# Patient Record
Sex: MALE | Race: WHITE
[De-identification: names, ages, dates, MRNs, and addresses within clinical notes are randomized per-mention and may not be internally consistent; named-entity substitution may affect disease eponyms.]

---

## 2019-03-29 ENCOUNTER — HOSPITAL ENCOUNTER (EMERGENCY)
Dept: HOSPITAL 92 - SCSER | Age: 34
Discharge: HOME | End: 2019-03-29
Payer: SELF-PAY

## 2019-03-29 DIAGNOSIS — N13.2: Primary | ICD-10-CM

## 2019-03-29 DIAGNOSIS — F17.210: ICD-10-CM

## 2019-03-29 LAB
ALBUMIN SERPL BCG-MCNC: 4.5 G/DL (ref 3.5–5)
ALP SERPL-CCNC: 77 U/L (ref 40–150)
ALT SERPL W P-5'-P-CCNC: 17 U/L (ref 8–55)
ANION GAP SERPL CALC-SCNC: 15 MMOL/L (ref 10–20)
AST SERPL-CCNC: 15 U/L (ref 5–34)
BACTERIA UR QL AUTO: (no result) HPF
BILIRUB SERPL-MCNC: 0.3 MG/DL (ref 0.2–1.2)
BUN SERPL-MCNC: 20 MG/DL (ref 8.9–20.6)
CALCIUM SERPL-MCNC: 9.9 MG/DL (ref 7.8–10.44)
CHLORIDE SERPL-SCNC: 104 MMOL/L (ref 98–107)
CO2 SERPL-SCNC: 24 MMOL/L (ref 22–29)
CREAT CL PREDICTED SERPL C-G-VRATE: 0 ML/MIN (ref 70–130)
GLOBULIN SER CALC-MCNC: 3.1 G/DL (ref 2.4–3.5)
GLUCOSE SERPL-MCNC: 91 MG/DL (ref 70–105)
HGB BLD-MCNC: 16.4 G/DL (ref 14–18)
HYALINE CASTS #/AREA URNS LPF: (no result) LPF
MCH RBC QN AUTO: 28.7 PG (ref 27–31)
MCV RBC AUTO: 89.7 FL (ref 78–98)
MDIFF COMPLETE?: YES
PLATELET # BLD AUTO: 329 THOU/UL (ref 130–400)
POTASSIUM SERPL-SCNC: 4.3 MMOL/L (ref 3.5–5.1)
PROT UR STRIP.AUTO-MCNC: 30 MG/DL
RBC # BLD AUTO: 5.71 MILL/UL (ref 4.7–6.1)
RBC UR QL AUTO: (no result) HPF (ref 0–3)
SODIUM SERPL-SCNC: 139 MMOL/L (ref 136–145)
SP GR UR STRIP: 1.01 (ref 1–1.03)
WBC # BLD AUTO: 7.7 THOU/UL (ref 4.8–10.8)
WBC UR QL AUTO: (no result) HPF (ref 0–3)

## 2019-03-29 PROCEDURE — 85025 COMPLETE CBC W/AUTO DIFF WBC: CPT

## 2019-03-29 PROCEDURE — 96375 TX/PRO/DX INJ NEW DRUG ADDON: CPT

## 2019-03-29 PROCEDURE — 81003 URINALYSIS AUTO W/O SCOPE: CPT

## 2019-03-29 PROCEDURE — 96374 THER/PROPH/DIAG INJ IV PUSH: CPT

## 2019-03-29 PROCEDURE — 80053 COMPREHEN METABOLIC PANEL: CPT

## 2019-03-29 PROCEDURE — 74176 CT ABD & PELVIS W/O CONTRAST: CPT

## 2019-03-29 PROCEDURE — 81015 MICROSCOPIC EXAM OF URINE: CPT

## 2019-03-29 PROCEDURE — 96361 HYDRATE IV INFUSION ADD-ON: CPT

## 2019-03-29 NOTE — CT
FCT of abdomen and pelvis 3/29/2019



COMPARISON: 3/14/2013



HISTORY: Left-sided flank pain



TECHNIQUE: Axial CT imaging at 5 mm intervals from lung bases through pubic symphysis without contras
t. Coronal reformatted imaging obtained.



FINDINGS: There are 3 subcentimeter nodules identified within the imaged right lung base, measuring u
p to 4 mm within the right lower lobe, unchanged when compared to the 2013 examination. No free intra
peritoneal air or fluid is noted.



The lack of contrast media limits assessment of the viscera, bowel, vascular structures, and for lymp
hadenopathy.



The liver, gallbladder, and spleen demonstrate a normal noncontrast enhanced appearance. The pancreas
 and bilateral adrenal glands are grossly unremarkable.



There is a punctate nonobstructing stone noted in the upper pole of the right kidney. There is a vagu
e hypodensity in the lower pole of the right kidney on axial image 35 measuring approximately 8 mm in
 transverse dimension, too small to characterize. There is no evidence for obstructive uropathy on th
e right.



There is a punctate nonobstructing stone in the upper pole of the left kidney. There is mild left hyd
ronephrosis and proximal left-sided hydroureter secondary to an obstructing stone within the mid left
 ureter at the axial level of the L5 vertebral body measuring 4-5 mm. No additional obstructing stone
 is noted on the left.



There is a structure of calcific density along the dorsal aspect of the penis and a subcutaneous loca
tion, etiology uncertain.



Limited assessment of the bowel reveals no evidence for inflammatory change or obstruction.



The appendix appears unremarkable.



Review of the osseous structures demonstrates no worrisome lytic or blastic lesion.



IMPRESSION: Obstructive uropathy on the left secondary to a 5 mm stone within the mid left ureter. Pu
nctate bilateral renal calculi noted.



Reported By: Chirag Gonzalez 

Electronically Signed:  3/29/2019 10:25 AM

## 2020-02-05 ENCOUNTER — HOSPITAL ENCOUNTER (EMERGENCY)
Dept: HOSPITAL 92 - ERS | Age: 35
Discharge: HOME | End: 2020-02-05
Payer: SELF-PAY

## 2020-02-05 DIAGNOSIS — K02.9: Primary | ICD-10-CM

## 2020-02-05 PROCEDURE — 99282 EMERGENCY DEPT VISIT SF MDM: CPT

## 2020-02-05 PROCEDURE — 96372 THER/PROPH/DIAG INJ SC/IM: CPT

## 2023-08-25 ENCOUNTER — HOSPITAL ENCOUNTER (EMERGENCY)
Dept: HOSPITAL 92 - CSHERS | Age: 38
Discharge: HOME | End: 2023-08-25
Payer: COMMERCIAL

## 2023-08-25 DIAGNOSIS — N13.2: Primary | ICD-10-CM

## 2023-08-25 LAB
ALBUMIN SERPL BCG-MCNC: 4.3 G/DL (ref 3.5–5)
ALP SERPL-CCNC: 68 U/L (ref 40–110)
ALT SERPL W P-5'-P-CCNC: 19 U/L (ref 8–55)
ANION GAP SERPL CALC-SCNC: 15 MMOL/L (ref 10–20)
AST SERPL-CCNC: 17 U/L (ref 5–34)
BASOPHILS # BLD AUTO: 0.1 10X3/UL (ref 0–0.2)
BASOPHILS NFR BLD AUTO: 0.5 % (ref 0–2)
BILIRUB SERPL-MCNC: 0.7 MG/DL (ref 0.2–1.2)
BUN SERPL-MCNC: 14 MG/DL (ref 8.9–20.6)
CALCIUM SERPL-MCNC: 8.8 MG/DL (ref 7.8–10.44)
CAUTI INDICATIONS FOR CULTURE: (no result)
CHLORIDE SERPL-SCNC: 111 MMOL/L (ref 98–107)
CO2 SERPL-SCNC: 21 MMOL/L (ref 22–29)
CREAT CL PREDICTED SERPL C-G-VRATE: 0 ML/MIN (ref 70–130)
EOSINOPHIL # BLD AUTO: 0.1 10X3/UL (ref 0–0.5)
EOSINOPHIL NFR BLD AUTO: 0.6 % (ref 0–6)
GLOBULIN SER CALC-MCNC: 2.6 G/DL (ref 2.4–3.5)
GLUCOSE SERPL-MCNC: 92 MG/DL (ref 70–105)
HCT VFR BLD CALC: 47.1 % (ref 38.8–50)
HGB BLD-MCNC: 16.1 G/DL (ref 13.5–17.5)
LYMPHOCYTES NFR BLD AUTO: 7.8 % (ref 18–47)
MAGNESIUM SERPL-MCNC: 1.8 MG/DL (ref 1.6–2.6)
MCH RBC QN AUTO: 29.7 PG (ref 27–33)
MCV RBC AUTO: 86.9 FL (ref 81.2–95.1)
MONOCYTES # BLD AUTO: 0.4 10X3/UL (ref 0–1.1)
MONOCYTES NFR BLD AUTO: 3.6 % (ref 0–10)
NEUTROPHILS # BLD AUTO: 10.2 10X3/UL (ref 1.5–8.4)
NEUTROPHILS NFR BLD AUTO: 87.2 % (ref 40–75)
PLATELET # BLD AUTO: 293 10X3/UL (ref 150–450)
POTASSIUM SERPL-SCNC: 3.9 MMOL/L (ref 3.5–5.1)
PROT UR STRIP.AUTO-MCNC: 15 MG/DL
RBC # BLD AUTO: 5.42 10X6/UL (ref 4.32–5.72)
RBC UR QL AUTO: (no result) HPF (ref 0–3)
SODIUM SERPL-SCNC: 143 MMOL/L (ref 136–145)
SP GR UR STRIP: 1.01 (ref 1–1.03)
WBC # BLD AUTO: 11.8 10X3/UL (ref 3.5–10.5)
WBC UR QL AUTO: (no result) HPF (ref 0–3)

## 2023-08-25 PROCEDURE — 83735 ASSAY OF MAGNESIUM: CPT

## 2023-08-25 PROCEDURE — 96374 THER/PROPH/DIAG INJ IV PUSH: CPT

## 2023-08-25 PROCEDURE — 81001 URINALYSIS AUTO W/SCOPE: CPT

## 2023-08-25 PROCEDURE — 36415 COLL VENOUS BLD VENIPUNCTURE: CPT

## 2023-08-25 PROCEDURE — 85025 COMPLETE CBC W/AUTO DIFF WBC: CPT

## 2023-08-25 PROCEDURE — 80053 COMPREHEN METABOLIC PANEL: CPT

## 2023-08-25 PROCEDURE — 74176 CT ABD & PELVIS W/O CONTRAST: CPT
